# Patient Record
Sex: FEMALE | Employment: FULL TIME | URBAN - METROPOLITAN AREA
[De-identification: names, ages, dates, MRNs, and addresses within clinical notes are randomized per-mention and may not be internally consistent; named-entity substitution may affect disease eponyms.]

---

## 2018-01-25 ENCOUNTER — APPOINTMENT (OUTPATIENT)
Dept: GENERAL RADIOLOGY | Age: 43
End: 2018-01-25
Attending: STUDENT IN AN ORGANIZED HEALTH CARE EDUCATION/TRAINING PROGRAM
Payer: COMMERCIAL

## 2018-01-25 ENCOUNTER — HOSPITAL ENCOUNTER (EMERGENCY)
Age: 43
Discharge: HOME | End: 2018-01-25
Attending: STUDENT IN AN ORGANIZED HEALTH CARE EDUCATION/TRAINING PROGRAM
Payer: COMMERCIAL

## 2018-01-25 VITALS
TEMPERATURE: 98.6 F | RESPIRATION RATE: 16 BRPM | SYSTOLIC BLOOD PRESSURE: 122 MMHG | WEIGHT: 233.38 LBS | HEART RATE: 69 BPM | HEIGHT: 61 IN | DIASTOLIC BLOOD PRESSURE: 85 MMHG | OXYGEN SATURATION: 100 % | BODY MASS INDEX: 44.06 KG/M2
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
## 2018-01-25 PROCEDURE — 99282 EMERGENCY DEPT VISIT SF MDM: CPT

## 2018-01-25 PROCEDURE — 71045 X-RAY EXAM CHEST 1 VIEW: CPT

## 2018-01-25 RX ORDER — NAPROXEN 500 MG/1
500 TABLET ORAL 2 TIMES DAILY WITH MEALS
Qty: 10 TAB | Refills: 0 | Status: SHIPPED | OUTPATIENT
Start: 2018-01-25 | End: 2018-01-30
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
## 2018-01-26 NOTE — ED NOTES
I have reviewed discharge instructions with the patient. The patient verbalized understanding. VSS, respirations unlabored and in no acute distress. Ambulated with a steady gait.
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
## 2018-02-20 ENCOUNTER — OFFICE VISIT (OUTPATIENT)
Dept: NEUROLOGY | Age: 43
End: 2018-02-20

## 2018-02-20 VITALS
WEIGHT: 233 LBS | HEIGHT: 61 IN | HEART RATE: 63 BPM | RESPIRATION RATE: 16 BRPM | DIASTOLIC BLOOD PRESSURE: 80 MMHG | OXYGEN SATURATION: 98 % | BODY MASS INDEX: 43.99 KG/M2 | SYSTOLIC BLOOD PRESSURE: 140 MMHG

## 2018-02-20 DIAGNOSIS — S06.0X1A CONCUSSION WITH LOSS OF CONSCIOUSNESS OF 30 MINUTES OR LESS, INITIAL ENCOUNTER: Primary | ICD-10-CM

## 2018-02-20 RX ORDER — DIAZEPAM 5 MG/1
5 TABLET ORAL
COMMUNITY

## 2018-02-20 RX ORDER — METHYLPREDNISOLONE 4 MG/1
TABLET ORAL
Qty: 1 DOSE PACK | Refills: 0 | Status: SHIPPED | OUTPATIENT
Start: 2018-02-20

## 2018-02-20 RX ORDER — AMITRIPTYLINE HYDROCHLORIDE 25 MG/1
TABLET, FILM COATED ORAL
Qty: 30 TAB | Refills: 1 | Status: SHIPPED | OUTPATIENT
Start: 2018-02-20 | End: 2018-03-19 | Stop reason: SDUPTHER

## 2018-02-20 RX ORDER — NAPROXEN 500 MG/1
500 TABLET ORAL 2 TIMES DAILY WITH MEALS
COMMUNITY

## 2018-02-20 RX ORDER — IBUPROFEN 600 MG/1
TABLET ORAL
COMMUNITY
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
## 2018-02-20 NOTE — MR AVS SNAPSHOT
[facility] [address] [place] [address] 
[phone] Patient: Monika Phoenix MRN: [id number] :1975 Visit Information Date & Time Provider Department Dept. Phone Encounter #  
 2018 10:00 AM 81[facility] DO [facility] [id number] Follow-up Instructions Return in about 2 months (around 2018). Upcoming Health Maintenance Date Due DTaP/Tdap/Td series (1 - Tdap) 3/12/1996 PAP AKA CERVICAL CYTOLOGY 3/12/1996 Influenza Age 5 to Adult 2017 Allergies as of 2018  Review Complete On: 2018 By: 2 [facility], [address], DO No Known Allergies Current Immunizations  Never Reviewed No immunizations on file. Not reviewed this visit You Were Diagnosed With   
  
 Codes Comments Concussion with loss of consciousness of 30 minutes or less, initial encounter    -  Primary ICD-10-CM: E52.8H5Q 
ICD-9-CM: 850.11 Vitals BP Pulse Resp Height(growth percentile) Weight(growth percentile) LMP  
 140/80 63 16 5' 1\" (1.549 m) 233 lb (105.7 kg) 2018 (Approximate) SpO2 BMI Smoking Status 98% 44.02 kg/m2 Never Smoker BMI and BSA Data Body Mass Index Body Surface Area 44.02 kg/m 2 2.13 m 2 Preferred Pharmacy Pharmacy Name Phone [facility] [address] [phone] Your Updated Medication List  
  
   
This list is accurate as of: 18 10:44 AM.  Always use your most recent med list.  
  
  
  
  
 amitriptyline 25 mg tablet Commonly known as:  ELAVIL One half tablet at bedtime. Increase to 1 tablet after 5 days. HYDROcodone-acetaminophen  mg per tablet Commonly known as:  Lortab 10/500 Take 1 Tab by mouth every six (6) hours as needed for Pain. ibuprofen 600 mg tablet Commonly known as:  MOTRIN Take  by mouth every six (6) hours as needed for Pain. methylPREDNISolone 4 mg tablet Commonly known as:  Lida Hoguet Per package instructions  
  
 naproxen 500 mg tablet Commonly known as:  NAPROSYN Take 500 mg by mouth two (2) times daily (with meals). VALIUM 5 mg tablet Generic drug:  diazePAM  
Take 5 mg by mouth every six (6) hours as needed for Anxiety. Prescriptions Sent to Pharmacy Refills  
 methylPREDNISolone (MEDROL DOSEPACK) 4 mg tablet 0 Sig: Per package instructions Class: Normal  
 Pharmacy: [facility] Ph #: [phone]  
 amitriptyline (ELAVIL) 25 mg tablet 1 Sig: One half tablet at bedtime. Increase to 1 tablet after 5 days. Class: Normal  
 Pharmacy: [facility] Ph #: [phone] We Performed the Following REFERRAL TO PHYSICAL THERAPY [LCV90 Custom] Comments:  
 Concussion therapy Follow-up Instructions Return in about 2 months (around 4/20/2018). To-Do List   
 02/20/2018 Imaging:  CT HEAD WO CONT Referral Information Referral ID Referred By Referred To  
  
 [id number] Ester Maker Not Available Visits Status Start Date End Date 1 New Request 2/20/18 2/20/19 If your referral has a status of pending review or denied, additional information will be sent to support the outcome of this decision. Patient Instructions Concussion: Care Instructions Your Care Instructions A concussion is a kind of injury to the brain. It happens when the head receives a hard blow. The impact can jar or shake the brain against the skull. This interrupts the brain's normal activities.  Although you may have cuts or bruises on your head or face, you may have no other visible signs of a brain injury. In most cases, damage to the brain from a concussion can't be seen in tests such as a CT or MRI scan. For a few weeks, you may have low energy, dizziness, trouble sleeping, a headache, ringing in your ears, or nausea. You may also feel anxious, grumpy, or depressed. You may have problems with memory and concentration. These symptoms are common after a concussion. They should slowly improve over time. Sometimes this takes weeks or even months. Someone who lives with you should know how to care for you. Please share this and all information with a caregiver who will be available to help if needed. Follow-up care is a key part of your treatment and safety. Be sure to make and go to all appointments, and call your doctor if you are having problems. It's also a good idea to know your test results and keep a list of the medicines you take. How can you care for yourself at home? Pain control · Put ice or a cold pack on the part of your head that hurts for 10 to 20 minutes at a time. Put a thin cloth between the ice and your skin. · Be safe with medicines. Read and follow all instructions on the label. ¨ If the doctor gave you a prescription medicine for pain, take it as prescribed. ¨ If you are not taking a prescription pain medicine, ask your doctor if you can take an over-the-counter medicine. Recovery · Follow your doctor's instructions. He or she will tell you if you need someone to watch you closely for the next 24 hours or longer. · Rest is the best way to recover from a concussion. You need to rest your body and your brain: ¨ Get plenty of sleep at night. And take rest breaks during the day. ¨ Avoid activities that take a lot of physical or mental work. This includes housework, exercise, schoolwork, video games, text messaging, and using the computer. ¨ You may need to change your school or work schedule while you recover. ¨ Return to your normal activities slowly. Do not try to do too much at once. · Do not drink alcohol or use illegal drugs. Alcohol and illegal drugs can slow your recovery. And they can increase your risk of a second brain injury. · Avoid activities that could lead to another concussion. Follow your doctor's instructions for a gradual return to activity and sports. · Ask your doctor when it's okay for you to drive a car, ride a bike, or operate machinery. How should you return to activity? Your return to sports or activity should be gradual. It should only begin when all symptoms of a concussion are gone, both while at rest and during exercise or exertion. Doctors and concussion specialists suggest steps to follow for returning to sports after a concussion. Use these steps as a guide. You should slowly progress through the following levels of activity: 1. No activity. This means complete physical and mental rest. 
2. Light aerobic activity. This can include walking, swimming, or other exercise at less than 70% of maximum heart rate. No resistance training is included in this step. 3. Sport-specific exercise. This includes running drills or skating drills (depending on the sport), but no head impact. 4. Noncontact training drills. This includes more complex training drills such as passing. The athlete may also begin light resistance training. 5. Full-contact practice. The athlete can participate in normal training. 6. Return to normal game play. This is the final step and allows the athlete to join in normal game play. Watch and keep track of your progress. It should take at least 6 days for you to go from light activity to normal game play. Make sure that you can stay at each new level of activity for at least 24 hours without symptoms, or as long as your doctor says, before doing more. If one or more symptoms come back, return to a lower level of activity for at least 24 hours. Don't move on until all symptoms are gone. When should you call for help? Call 911 anytime you think you may need emergency care. For example, call if: 
? · You have a seizure. ? · You passed out (lost consciousness). ? · You are confused or can't stay awake. ?Call your doctor now or seek immediate medical care if: 
? · You have new or worse vomiting. ? · You feel less alert. ? · You have new weakness or numbness in any part of your body. ? Watch closely for changes in your health, and be sure to contact your doctor if: 
? · You do not get better as expected. ? · You have new symptoms, such as headaches, trouble concentrating, or changes in mood. Where can you learn more? Go to http://talisha-mary.info/. Enter B862 in the search box to learn more about \"Concussion: Care Instructions. \" Current as of: October 14, 2016 Content Version: 11.4 © 6691-0444 [employer]. Care instructions adapted under license by [employer] (which disclaims liability or warranty for this information). If you have questions about a medical condition or this instruction, always ask your healthcare professional. [address] any warranty or liability for your use of this information. CT Scan of the Head: About This Test 
What is it? A CT (computed tomography) scan uses X-rays to make detailed pictures of your body and the structures inside your body. A CT scan of the head can give your doctor information about your eyes, the bones of your face and nose, your inner ear, and your brain. During the test, you will lie on a table that is attached to the [employer]. The CT scanner is a large doughnut-shaped machine. Why is this test done?  
A CT scan of the head can help find the cause of symptoms that may mean you have a brain injury or bleeding inside your head. It can also find a tumor and damage caused by a stroke and help find the best treatment for the cause of a stroke. How can you prepare for the test? 
Talk to your doctor about all your health conditions before the test. For example, tell your doctor if: 
· You are or might be pregnant. · You are allergic to any medicines. · You have diabetes. · You take metformin. · You are breastfeeding. · You get nervous in confined spaces. You may need medicine to help you relax. What happens before the test? 
· You may have to take off jewelry, glasses, or hearing aids. Wear comfortable, loose-fitting clothes. · You may have contrast material (dye) put into your arm through a tube called an IV. Contrast material helps doctors see specific organs, blood vessels, and most tumors. What happens during the test? 
· You will lie on a table that is attached to the CT scanner. Straps will hold your head still but your face will not be covered. · The table will slide into the round opening of the scanner. The table will move during the scan. The scanner moves inside the doughnut-shaped casing around your body. · You will be asked to hold still during the scan. You may be asked to hold your breath for short periods. · You may be alone in the scanning room, but a technologist will be watching you through a window and talking with you during the test. 
What else should you know about the test? 
· A CT scan does not hurt. · If a dye is used, you may feel a quick sting or pinch when the IV is started. The dye may make you feel warm and flushed and give you a metallic taste in your mouth. Some people feel sick to their stomach or get a headache. · If you breastfeed and are concerned about whether the dye used in this test is safe, talk to your doctor. Most experts believe that very little dye passes into breast milk and even less is passed on to the baby.  But if you prefer, you can store some of your breast milk ahead of time and use it for a day or two after the test. 
· There is a small chance ofgetting cancer from some types of CT scans. The risk is higher inchildren, young adults, and people who have many radiation tests. If you are concerned about this risk, talk to your doctor about the benefits and risks of a CT scan andconfirm that the test is needed. How long does the test take? · The test will take about 30 to 60 minutes. Most of this time is spent getting ready for the scan. The actual test only takes a few minutes. What happens after the test? 
· You will probably be able to go home right away. · You can go back to your usual activities right away. · Drink plenty of fluids for 24 hours after the test if dye was used, unless your doctor tells you not to. When should you call for help? Watch closely for changes in your health, and be sure to contact your doctor if you have any problems. Follow-up care is a key part of your treatment and safety. Be sure to make and go to all appointments, and call your doctor if you are having problems. It's also a good idea to keep a list of the medicines you take. Ask your doctor when you can expect to have your test results. Where can you learn more? Go to http://talisha-mary.info/. Enter U410 in the search box to learn more about \"CT Scan of the Head: About This Test.\" Current as of: October 14, 2016 Content Version: 11.4 © 3923-8603 [employer]wise[employer]. Care instructions adapted under license by [employer] (which disclaims liability or warranty for this information). If you have questions about a medical condition or this instruction, always ask your healthcare professional. [address] any warranty or liability for your use of this information. Introducing [facility] & HEALTH SERVICES!    
 [facility] introduces Seesaw patient portal. Now you can access parts of your medical record, email your doctor's office, and request medication refills online. 1. In your internet browser, go to [email or web address] 2. Click on the First Time User? Click Here link in the Sign In box. You will see the New Member Sign Up page. 3. Enter your Absio Access Code exactly as it appears below. You will not need to use this code after youve completed the sign-up process. If you do not sign up before the expiration date, you must request a new code. · Absio Access Code: [email or web address] Expires: 4/25/2018  9:07 PM 
 
4. Enter the last four digits of your Social Security Number (xxxx) and Date of Birth (mm/dd/yyyy) as indicated and click Submit. You will be taken to the next sign-up page. 5. Create a Absio ID. This will be your Absio login ID and cannot be changed, so think of one that is secure and easy to remember. 6. Create a Absio password. You can change your password at any time. 7. Enter your Password Reset Question and Answer. This can be used at a later time if you forget your password. 8. Enter your e-mail address. You will receive e-mail notification when new information is available in [address] 9. Click Sign Up. You can now view and download portions of your medical record. 10. Click the Download Summary menu link to download a portable copy of your medical information. If you have questions, please visit the Frequently Asked Questions section of the Absio website. Remember, Absio is NOT to be used for urgent needs. For medical emergencies, dial 911. Now available from your iPhone and Android! Please provide this summary of care documentation to your next provider. Your primary care clinician is listed as Michelle Bettencourt. If you have any questions after today's visit, please call [phone].
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
## 2018-02-23 ENCOUNTER — TELEPHONE (OUTPATIENT)
Dept: NEUROLOGY | Age: 43
End: 2018-02-23

## 2018-02-23 NOTE — TELEPHONE ENCOUNTER
Pt calling, stated that Ayo Whatley sent a request to the office, would like to clarify that documents were received.

## 2018-02-27 ENCOUNTER — TELEPHONE (OUTPATIENT)
Dept: NEUROLOGY | Age: 43
End: 2018-02-27

## 2018-03-01 ENCOUNTER — HOSPITAL ENCOUNTER (OUTPATIENT)
Dept: CT IMAGING | Age: 43
Discharge: HOME | End: 2018-03-01
Attending: PSYCHIATRY & NEUROLOGY
Payer: COMMERCIAL

## 2018-03-01 DIAGNOSIS — S06.0X1A CONCUSSION WITH LOSS OF CONSCIOUSNESS OF 30 MINUTES OR LESS, INITIAL ENCOUNTER: ICD-10-CM

## 2018-03-01 PROCEDURE — 70450 CT HEAD/BRAIN W/O DYE: CPT

## 2018-03-05 ENCOUNTER — TELEPHONE (OUTPATIENT)
Dept: NEUROLOGY | Age: 43
End: 2018-03-05

## 2018-03-13 ENCOUNTER — TELEPHONE (OUTPATIENT)
Dept: NEUROLOGY | Age: 43
End: 2018-03-13

## 2018-03-14 NOTE — TELEPHONE ENCOUNTER
Pt works for [employer] and has been out of work since January. They are now telling her that her STD will not be approved. Pt is requesting letter from you listing objective and subjective findings as well as a release. Pt was advised to make an appt and bring paperwork with her.

## 2018-03-19 ENCOUNTER — HOSPITAL ENCOUNTER (OUTPATIENT)
Dept: PHYSICAL THERAPY | Age: 43
Discharge: HOME | End: 2018-03-19
Payer: COMMERCIAL

## 2018-03-19 ENCOUNTER — OFFICE VISIT (OUTPATIENT)
Dept: NEUROLOGY | Age: 43
End: 2018-03-19

## 2018-03-19 VITALS
DIASTOLIC BLOOD PRESSURE: 73 MMHG | WEIGHT: 233 LBS | OXYGEN SATURATION: 100 % | SYSTOLIC BLOOD PRESSURE: 141 MMHG | BODY MASS INDEX: 43.99 KG/M2 | HEIGHT: 61 IN | RESPIRATION RATE: 18 BRPM | HEART RATE: 74 BPM

## 2018-03-19 DIAGNOSIS — S06.0X1D CONCUSSION WITH LOSS OF CONSCIOUSNESS OF 30 MINUTES OR LESS, SUBSEQUENT ENCOUNTER: Primary | ICD-10-CM

## 2018-03-19 DIAGNOSIS — F07.81 POSTCONCUSSION SYNDROME: ICD-10-CM

## 2018-03-19 PROCEDURE — 97161 PT EVAL LOW COMPLEX 20 MIN: CPT

## 2018-03-19 PROCEDURE — 97535 SELF CARE MNGMENT TRAINING: CPT

## 2018-03-19 RX ORDER — AMITRIPTYLINE HYDROCHLORIDE 25 MG/1
TABLET, FILM COATED ORAL
Qty: 45 TAB | Refills: 2 | Status: SHIPPED | OUTPATIENT
Start: 2018-03-19 | End: 2018-05-01 | Stop reason: SDUPTHER
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
## 2018-03-19 NOTE — PATIENT INSTRUCTIONS
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
## 2018-03-23 NOTE — TELEPHONE ENCOUNTER
Spoke with pt. Will fax both letters 2/20 and 3/19/2018 to Lorelei Lamb at [phone] per pt request for work.

## 2018-03-29 ENCOUNTER — TELEPHONE (OUTPATIENT)
Dept: NEUROLOGY | Age: 43
End: 2018-03-29

## 2018-03-29 NOTE — TELEPHONE ENCOUNTER
Dr. Nika Rodriguez requesting a call from Dr. Rahat Purvis to discuss patient's disability application.  Please call [phone]

## 2018-04-11 ENCOUNTER — TELEPHONE (OUTPATIENT)
Dept: NEUROLOGY | Age: 43
End: 2018-04-11

## 2018-04-11 NOTE — TELEPHONE ENCOUNTER
Pt calling stating that her employer has been calling for the nurse and has never received a phone call. Pt has been waiting on a returned phone call and has not gotten paid in a month because of this.

## 2018-04-12 NOTE — TELEPHONE ENCOUNTER
Dr. Rony Cabrera calling in regards to patient's disability paperwork,  can be reached on mobile: [phone]

## 2018-04-13 ENCOUNTER — TELEPHONE (OUTPATIENT)
Dept: NEUROLOGY | Age: 43
End: 2018-04-13

## 2018-04-13 NOTE — TELEPHONE ENCOUNTER
Received a disability claim and they would like to set up a peer to peer with Dr. Mahendra Lawler. Please call back with availability.

## 2018-04-16 NOTE — TELEPHONE ENCOUNTER
I spoke with Dr. Erika Vaca by phone April 13 approximately 3:59 PM.  We spoke on the phone at length about Kavitha Trejo's case. Her questions were answered.

## 2018-04-20 ENCOUNTER — TELEPHONE (OUTPATIENT)
Dept: NEUROLOGY | Age: 43
End: 2018-04-20

## 2018-04-20 NOTE — TELEPHONE ENCOUNTER
Pt requesting a returned phone call in regards to short term disability that was denied, pt stated that her employer told her they were unable to contact Dr. Wolf Martin to discuss the patient's case.

## 2018-04-20 NOTE — TELEPHONE ENCOUNTER
I spoke with Dr. Mitchel Nunez by phone last Friday the 13th at 4 PM by phone directly. I told the physician on the phone that I recommended she be off of work initially like my note had suggested and I also recommended half days. In the end, the disability company can decide to make their decision at their own discretion despite my recommendation.

## 2018-04-20 NOTE — TELEPHONE ENCOUNTER
Spoke with patient, informed her per -I spoke with Dr. Magdaleno Yoon by phone last Friday the 13th at 4 PM by phone directly.     I told the physician on the phone that I recommended she be off of work initially like my note had suggested and I also recommended half days.     In the end, the disability company can decide to make their decision at their own discretion despite my recommendation    She verbalized understanding.

## 2018-05-01 ENCOUNTER — OFFICE VISIT (OUTPATIENT)
Dept: NEUROLOGY | Age: 43
End: 2018-05-01

## 2018-05-01 VITALS
OXYGEN SATURATION: 99 % | HEART RATE: 70 BPM | RESPIRATION RATE: 14 BRPM | HEIGHT: 61 IN | WEIGHT: 238 LBS | BODY MASS INDEX: 44.93 KG/M2 | DIASTOLIC BLOOD PRESSURE: 80 MMHG | SYSTOLIC BLOOD PRESSURE: 140 MMHG

## 2018-05-01 DIAGNOSIS — S06.0X1D CONCUSSION WITH LOSS OF CONSCIOUSNESS OF 30 MINUTES OR LESS, SUBSEQUENT ENCOUNTER: Primary | ICD-10-CM

## 2018-05-01 RX ORDER — TOPIRAMATE 25 MG/1
25 TABLET ORAL 2 TIMES DAILY
Qty: 60 TAB | Refills: 2 | Status: SHIPPED | OUTPATIENT
Start: 2018-05-01

## 2018-05-01 RX ORDER — AMITRIPTYLINE HYDROCHLORIDE 25 MG/1
TABLET, FILM COATED ORAL
Qty: 45 TAB | Refills: 2 | Status: SHIPPED | OUTPATIENT
Start: 2018-05-01

## 2018-05-01 NOTE — PROGRESS NOTES
Patient is here for follow up for concussion  Still having headaches  Light headed  Improvement in symptoms less intensity
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
## 2018-05-01 NOTE — PATIENT INSTRUCTIONS
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
